# Patient Record
Sex: MALE | Race: WHITE
[De-identification: names, ages, dates, MRNs, and addresses within clinical notes are randomized per-mention and may not be internally consistent; named-entity substitution may affect disease eponyms.]

---

## 2018-04-04 ENCOUNTER — HOSPITAL ENCOUNTER (OUTPATIENT)
Dept: HOSPITAL 62 - RAD | Age: 58
End: 2018-04-04
Attending: PHYSICIAN ASSISTANT
Payer: COMMERCIAL

## 2018-04-04 DIAGNOSIS — M25.531: Primary | ICD-10-CM

## 2018-04-06 NOTE — RADIOLOGY REPORT (SQ)
EXAM DESCRIPTION:  MRI RT UPPER JOINT WITHOUT



COMPLETED DATE/TIME:  4/4/2018 8:03 pm



REASON FOR STUDY:  PAIN IN RIGHT WRIST M25.531  PAIN IN RIGHT WRIST



COMPARISON:  Previous study 4/10/2015



TECHNIQUE:  Right wrist images acquired and stored on PACS.  Multiplanar images include fat sensitive
 sequences as T1, fluid sensitive sequences as FST2/STIR, cartilage sensitive sequences as FSPD, grad
ient echo sequences.



LIMITATIONS:  None.



FINDINGS:  BONE MARROW: No alteration of signal to suggest marrow replacement or edema. No occult fra
cture. No large osteophytes.  Multiple cystic changes in the carpal bones.

CARPAL ALIGNMENT AND ARTICULATION: Normal congruity of sigmoid notch at level of distal RUJ without p
ositive or negative ulnar variance. Normal capitolunate angle. No widening of scapholunate articulati
on.

EFFUSION: None noted. No loose bodies.

SCAPHOLUNATE LIGAMENT: Intact without tear.

LUNATE-TRIQUETRAL LIGAMENT: Intact without tear.

TFC COMPLEX: Radial and ulnar attachments normal. Meniscus intact. Extensor carpi ulnaris tendon norm
al without tendinopathy.

EXTRINSIC LIGAMENTS AND DISTAL RADIO-ULNAR JOINT: Dorsal and volar distal RUJ intact without subluxat
ion of the distal ulna.

1-6 EXTENSOR COMPARTMENTS: The previously noted tenosynovitis of compartments 3 and 4 has resolved.  
Compartment 3 now appears normal.  Extensive fat is seenin compartment 4 and there appears to be atro
phy of several tendons.  There extensor indicis is not identified.

CARPAL TUNNEL AND MEDIAN NERVE: Normal volume and morphology of the carpal tunnel proximally at the l
evel of the radiocarpal joint and distally at the hook of the hamate. No thickening or signal alterat
ion of the median nerve.

OTHER: No other significant finding.



IMPRESSION:  Tenosynovitis seen in dorsal compartments 3 in for has resolved.  Tendon volume in whitney
rtment 3, extensor pollicis longus appears normal.  There is extensive fat in compartment 4, and ther
e appears to be tendon atrophy.  Specifically extensor indicis is not identified distally.



TECHNICAL DOCUMENTATION:  JOB ID:  [id number]

 2011 [employer]- All Rights Reserved



Reading location - IP/workstation name: [email or web address]

## 2018-05-25 ENCOUNTER — HOSPITAL ENCOUNTER (OUTPATIENT)
Dept: HOSPITAL 62 - OD | Age: 58
End: 2018-05-25
Attending: PHYSICIAN ASSISTANT
Payer: COMMERCIAL

## 2018-05-25 DIAGNOSIS — M79.642: Primary | ICD-10-CM

## 2018-05-25 DIAGNOSIS — M79.641: ICD-10-CM

## 2018-05-25 LAB
ADD MANUAL DIFF: NO
BASOPHILS # BLD AUTO: 0.1 10^3/UL (ref 0–0.2)
BASOPHILS NFR BLD AUTO: 0.6 % (ref 0–2)
CRP SERPL-MCNC: 16.8 MG/L (ref ?–10)
EOSINOPHIL # BLD AUTO: 0.2 10^3/UL (ref 0–0.6)
EOSINOPHIL NFR BLD AUTO: 2.2 % (ref 0–6)
ERYTHROCYTE [DISTWIDTH] IN BLOOD BY AUTOMATED COUNT: 13.2 % (ref 11.5–14)
ERYTHROCYTE [SEDIMENTATION RATE] IN BLOOD: 33 MM/HR (ref 0–20)
HCT VFR BLD CALC: 47.6 % (ref 37.9–51)
HGB BLD-MCNC: 16.2 G/DL (ref 13.5–17)
LYMPHOCYTES # BLD AUTO: 1.9 10^3/UL (ref 0.5–4.7)
LYMPHOCYTES NFR BLD AUTO: 19.9 % (ref 13–45)
MCH RBC QN AUTO: 31.2 PG (ref 27–33.4)
MCHC RBC AUTO-ENTMCNC: 34 G/DL (ref 32–36)
MCV RBC AUTO: 92 FL (ref 80–97)
MONOCYTES # BLD AUTO: 0.7 10^3/UL (ref 0.1–1.4)
MONOCYTES NFR BLD AUTO: 7.5 % (ref 3–13)
NEUTROPHILS # BLD AUTO: 6.7 10^3/UL (ref 1.7–8.2)
NEUTS SEG NFR BLD AUTO: 69.8 % (ref 42–78)
PLATELET # BLD: 361 10^3/UL (ref 150–450)
RBC # BLD AUTO: 5.2 10^6/UL (ref 4.35–5.55)
TOTAL CELLS COUNTED % (AUTO): 100 %
URATE SERPL-MCNC: 5.3 MG/DL (ref 3.5–8.5)
WBC # BLD AUTO: 9.6 10^3/UL (ref 4–10.5)

## 2018-05-25 PROCEDURE — 84550 ASSAY OF BLOOD/URIC ACID: CPT

## 2018-05-25 PROCEDURE — 86038 ANTINUCLEAR ANTIBODIES: CPT

## 2018-05-25 PROCEDURE — 86430 RHEUMATOID FACTOR TEST QUAL: CPT

## 2018-05-25 PROCEDURE — 85652 RBC SED RATE AUTOMATED: CPT

## 2018-05-25 PROCEDURE — 36415 COLL VENOUS BLD VENIPUNCTURE: CPT

## 2018-05-25 PROCEDURE — 85025 COMPLETE CBC W/AUTO DIFF WBC: CPT

## 2018-05-25 PROCEDURE — 86140 C-REACTIVE PROTEIN: CPT

## 2018-05-25 PROCEDURE — 86200 CCP ANTIBODY: CPT

## 2018-05-27 LAB — CYCLIC CITRUL PEPTIDE IGG/A AB: 3 UNITS (ref 0–19)

## 2020-01-09 ENCOUNTER — HOSPITAL ENCOUNTER (EMERGENCY)
Dept: HOSPITAL 62 - ER | Age: 60
Discharge: HOME | End: 2020-01-09
Payer: MEDICARE

## 2020-01-09 VITALS — SYSTOLIC BLOOD PRESSURE: 140 MMHG | DIASTOLIC BLOOD PRESSURE: 78 MMHG

## 2020-01-09 DIAGNOSIS — Z79.4: ICD-10-CM

## 2020-01-09 DIAGNOSIS — R10.84: Primary | ICD-10-CM

## 2020-01-09 DIAGNOSIS — I10: ICD-10-CM

## 2020-01-09 DIAGNOSIS — E11.9: ICD-10-CM

## 2020-01-09 DIAGNOSIS — F17.200: ICD-10-CM

## 2020-01-09 DIAGNOSIS — E66.9: ICD-10-CM

## 2020-01-09 LAB
ADD MANUAL DIFF: NO
ALBUMIN SERPL-MCNC: 4.2 G/DL (ref 3.5–5)
ALP SERPL-CCNC: 182 U/L (ref 38–126)
ANION GAP SERPL CALC-SCNC: 12 MMOL/L (ref 5–19)
APPEARANCE UR: CLEAR
APTT PPP: (no result) S
AST SERPL-CCNC: 13 U/L (ref 17–59)
BASE EXCESS BLDV CALC-SCNC: -3.5 MMOL/L
BASOPHILS # BLD AUTO: 0.1 10^3/UL (ref 0–0.2)
BASOPHILS NFR BLD AUTO: 1.2 % (ref 0–2)
BILIRUB DIRECT SERPL-MCNC: 0.2 MG/DL (ref 0–0.4)
BILIRUB SERPL-MCNC: 0.5 MG/DL (ref 0.2–1.3)
BILIRUB UR QL STRIP: NEGATIVE
BUN SERPL-MCNC: 9 MG/DL (ref 7–20)
CALCIUM: 9.8 MG/DL (ref 8.4–10.2)
CHLORIDE SERPL-SCNC: 89 MMOL/L (ref 98–107)
CO2 SERPL-SCNC: 33 MMOL/L (ref 22–30)
EOSINOPHIL # BLD AUTO: 0.1 10^3/UL (ref 0–0.6)
EOSINOPHIL NFR BLD AUTO: 0.8 % (ref 0–6)
ERYTHROCYTE [DISTWIDTH] IN BLOOD BY AUTOMATED COUNT: 13.7 % (ref 11.5–14)
GLUCOSE SERPL-MCNC: 496 MG/DL (ref 75–110)
GLUCOSE UR STRIP-MCNC: >=500 MG/DL
HCO3 BLDV-SCNC: 20.9 MMOL/L (ref 20–32)
HCT VFR BLD CALC: 46.1 % (ref 37.9–51)
HGB BLD-MCNC: 16.5 G/DL (ref 13.5–17)
KETONES UR STRIP-MCNC: NEGATIVE MG/DL
LYMPHOCYTES # BLD AUTO: 1.5 10^3/UL (ref 0.5–4.7)
LYMPHOCYTES NFR BLD AUTO: 15.3 % (ref 13–45)
MCH RBC QN AUTO: 32.6 PG (ref 27–33.4)
MCHC RBC AUTO-ENTMCNC: 35.7 G/DL (ref 32–36)
MCV RBC AUTO: 91 FL (ref 80–97)
MONOCYTES # BLD AUTO: 0.7 10^3/UL (ref 0.1–1.4)
MONOCYTES NFR BLD AUTO: 6.7 % (ref 3–13)
NEUTROPHILS # BLD AUTO: 7.5 10^3/UL (ref 1.7–8.2)
NEUTS SEG NFR BLD AUTO: 76 % (ref 42–78)
NITRITE UR QL STRIP: NEGATIVE
PCO2 BLDV: 35.8 MMHG (ref 35–63)
PH BLDV: 7.38 [PH] (ref 7.3–7.42)
PH UR STRIP: 5 [PH] (ref 5–9)
PLATELET # BLD: 341 10^3/UL (ref 150–450)
POTASSIUM SERPL-SCNC: 3.5 MMOL/L (ref 3.6–5)
PROT SERPL-MCNC: 7.5 G/DL (ref 6.3–8.2)
PROT UR STRIP-MCNC: NEGATIVE MG/DL
RBC # BLD AUTO: 5.04 10^6/UL (ref 4.35–5.55)
SP GR UR STRIP: 1.03
TOTAL CELLS COUNTED % (AUTO): 100 %
UROBILINOGEN UR-MCNC: NEGATIVE MG/DL (ref ?–2)
WBC # BLD AUTO: 9.8 10^3/UL (ref 4–10.5)

## 2020-01-09 PROCEDURE — 93005 ELECTROCARDIOGRAM TRACING: CPT

## 2020-01-09 PROCEDURE — 82803 BLOOD GASES ANY COMBINATION: CPT

## 2020-01-09 PROCEDURE — 80053 COMPREHEN METABOLIC PANEL: CPT

## 2020-01-09 PROCEDURE — 82962 GLUCOSE BLOOD TEST: CPT

## 2020-01-09 PROCEDURE — 81001 URINALYSIS AUTO W/SCOPE: CPT

## 2020-01-09 PROCEDURE — 99284 EMERGENCY DEPT VISIT MOD MDM: CPT

## 2020-01-09 PROCEDURE — 93010 ELECTROCARDIOGRAM REPORT: CPT

## 2020-01-09 PROCEDURE — 36415 COLL VENOUS BLD VENIPUNCTURE: CPT

## 2020-01-09 PROCEDURE — 71045 X-RAY EXAM CHEST 1 VIEW: CPT

## 2020-01-09 PROCEDURE — 74177 CT ABD & PELVIS W/CONTRAST: CPT

## 2020-01-09 PROCEDURE — 84484 ASSAY OF TROPONIN QUANT: CPT

## 2020-01-09 PROCEDURE — 96374 THER/PROPH/DIAG INJ IV PUSH: CPT

## 2020-01-09 PROCEDURE — 96376 TX/PRO/DX INJ SAME DRUG ADON: CPT

## 2020-01-09 PROCEDURE — 83690 ASSAY OF LIPASE: CPT

## 2020-01-09 PROCEDURE — 85025 COMPLETE CBC W/AUTO DIFF WBC: CPT

## 2020-01-09 PROCEDURE — 96361 HYDRATE IV INFUSION ADD-ON: CPT

## 2020-01-09 NOTE — EKG REPORT
SEVERITY:- ABNORMAL ECG -

SINUS RHYTHM

NONSPECIFIC IVCD WITH LAD

LOW VOLTAGE IN FRONTAL LEADS

:

Confirmed by: Chucho Lopez 09-Jan-2020 21:58:42

## 2020-01-09 NOTE — ER DOCUMENT REPORT
ED Medical Screen (RME)





- General


Chief Complaint: Abdominal Pain


Stated Complaint: ABDOMINAL PAIN


Time Seen by Provider: 01/09/20 11:39


Primary Care Provider: 


JAKE EDWARD PA-C [Primary Care Provider] - Follow up as needed


TRAVEL OUTSIDE OF THE [place] IN LAST 30 DAYS: No





- HPI


Notes: 





01/09/20 11:50


59-year-old male with a history of type 2 diabetes presents emergency room for 

complaints of generalized abdominal pain for the last week.  Patient was sent 

over by [employer] for concerns of pancreatitis and for a medical history 

hemoglobin A1c greater than 15%.  Denies any nausea vomiting diarrhea.  Patient 

reports intermittent chest pain or shortness of breath however he is not 

currently having any at this moment.  Has not tried any over-the-counter 

medications.  Worse with time, nothing makes better.  Decreased eating and 

drinking.





I have greeted and performed a rapid initial assessment of this patient.  A 

comprehensive ED assessment and evaluation of the patient, analysis of test 

results and completion of the medical decision making process will be conducted 

by additional ED providers.





PHYSICAL EXAMINATION:





GENERAL: Well-appearing, well-nourished and in mild distress





HEAD: Atraumatic, normocephalic.





EYES: Pupils equal round extraocular movements intact,  conjunctiva are normal.





NECK: Normal range of motion





CV: s1, s2 regular 


abd: generalized abd pain 





LUNGS: No respiratory distress





Musculoskeletal: Normal range of motion





NEUROLOGICAL:  Normal speech, normal gait. 





SKIN: Warm, Dry, normal turgor, no rashes or lesions noted.





- Related Data


Allergies/Adverse Reactions: 


                                        





No Known Allergies Allergy (Verified 03/13/16 22:11)


   











Past Medical History





- Past Medical History


Cardiac Medical History: Reports: Hx Coronary Artery Disease, Hx Hypertension


   Denies: Hx Heart Attack


Pulmonary Medical History: Reports: Hx Bronchitis, Hx COPD


   Denies: Hx Asthma, Hx Pneumonia


Neurological Medical History: Denies: Hx Cerebrovascular Accident - venous 

angioma, Hx Seizures


Endocrine Medical History: Reports: Hx Diabetes Mellitus Type 2


Musculoskeltal Medical History: Reports Hx Arthritis


Past Surgical History: Reports: Hx Orthopedic Surgery - bilat feet & hands





- Immunizations


Immunizations up to date: Yes


Hx Diphtheria, Pertussis, Tetanus Vaccination: No





Physical Exam





- Vital signs


Vitals: 





                                        











Temp Pulse Resp BP Pulse Ox


 


 98.7 F   88   20   140/78 H  96 


 


 01/09/20 11:29  01/09/20 11:29  01/09/20 11:29  01/09/20 11:29  01/09/20 11:29














Course





- Vital Signs


Vital signs: 





                                        











Temp Pulse Resp BP Pulse Ox


 


 98.7 F   88   20   140/78 H  96 


 


 01/09/20 11:29  01/09/20 11:29  01/09/20 11:29  01/09/20 11:29  01/09/20 11:29














Doctor's Discharge





- Discharge


Referrals: 


JAKE EDWARD PA-C [Primary Care Provider] - Follow up as needed

## 2020-01-09 NOTE — RADIOLOGY REPORT (SQ)
EXAM DESCRIPTION:  CT ABD/PELVIS WITH IV ONLY



COMPLETED DATE/TIME:  1/9/2020 1:46 pm



REASON FOR STUDY:  Generalized abdominal pain, rule out pancreatitis



COMPARISON:  None.



TECHNIQUE:  CT scan of the abdomen and pelvis performed using helical scanning technique with dynamic
 intravenous contrast injection.  No oral contrast. Images reviewed with lung, soft tissue, and bone 
windows. Reconstructed coronal and sagittal MPR images reviewed. Delayed images for evaluation of the
 urinary system also acquired. All images stored on PACS.

All CT scanners at this facility use dose modulation, iterative reconstruction, and/or weight based d
osing when appropriate to reduce radiation dose to as low as reasonably achievable (ALARA).

[facility]: Dose Right  [facility]: CareDose    [facility]: Dose Right    [facility]: Teradose 4D    [facility]: Smart Technologies



CONTRAST TYPE AND DOSE:  contrast/concentration: Isovue 350.00 mg/ml; Total Contrast Delivered: 87.0 
ml; Total Saline Delivered: 69.0 ml



RENAL FUNCTION:  GFR > 60.



RADIATION DOSE:  CT Rad equipment meets quality standard of care and radiation dose reduction techniq
ues were employed. CTDIvol: 7.5 - 10.2 mGy. DLP: 1004 mGy-cm..



LIMITATIONS:  None.



FINDINGS:  LOWER CHEST: No significant findings. No nodules or infiltrates.

LIVER: Normal size. No masses.  No dilated ducts.

SPLEEN: Normal size. No focal lesions.

PANCREAS: No masses. No significant calcifications. No adjacent inflammation or peripancreatic fluid 
collections. Pancreatic duct not dilated.

GALLBLADDER: No identified stones by CT criteria. No inflammatory changes to suggest cholecystitis.

ADRENAL GLANDS: No significant masses or asymmetry.

RIGHT KIDNEY AND URETER: No solid masses.   No significant calcifications.   No hydronephrosis or hyd
roureter.

LEFT KIDNEY AND URETER: No solid masses.   No significant calcifications.   No hydronephrosis or hydr
oureter.

AORTA AND VESSELS: No aneurysm.

RETROPERITONEUM: No retroperitoneal adenopathy, hemorrhage or masses.

BOWEL AND PERITONEAL CAVITY: No masses or inflammatory changes. No free fluid or peritoneal masses.

APPENDIX: Normal.

PELVIS: No mass.  No free fluid. Normal bladder.

ABDOMINAL WALL: No masses. No hernias.

BONES: No significant or acute findings.

OTHER: No other significant finding.



IMPRESSION:  NO SIGNIFICANT OR ACUTE FINDING IN THE ABDOMEN OR PELVIS ON CT SCAN WITH IV CONTRAST.



TECHNICAL DOCUMENTATION:  JOB ID:  [id number]

Quality ID # [id number]: Final reports with documentation of one or more dose reduction techniques (e.g., Au
tomated exposure control, adjustment of the mA and/or kV according to patient size, use of iterative 
reconstruction technique)

 2011 [employer]- All Rights Reserved



Reading location - IP/workstation name: [email or web address]

## 2020-01-09 NOTE — ER DOCUMENT REPORT
ED General





- General


Chief Complaint: Abdominal Pain


Stated Complaint: ABDOMINAL PAIN


Time Seen by Provider: 01/09/20 11:39


Primary Care Provider: 


EULA VALDERRAMA MD [ACTIVE STAFF] - Follow up in 1 week


JAKE EDWARD PA-C [NO LOCAL MD] - Follow up tomorrow


TRAVEL OUTSIDE OF THE [place] IN LAST 30 DAYS: No





- HPI


Notes: 





59-year-old male with history of type 2 diabetes on insulin to the emergency 

department with complaints of generalized abdominal pain that has been ongoing 

for the past 5 days.  He denies any nausea, vomiting, diarrhea.  He states that 

it does radiate through to his back.  He has a history of pancreatitis.  He went

to see his primary care physician this morning and they sent him to the 

emergency department for further evaluation.  He states that he has been taking 

his insulin as prescribed but typically his sugars run in the 300s or higher.  

Denies any chest pain or shortness of breath today.  His last colonoscopy was 

several years ago and he states he had a polyp and is supposed to have a repeat 

in 3 to 5 years.  He denies any constipation and his last bowel movement was 

this morning and normal.





- Related Data


Allergies/Adverse Reactions: 


                                        





No Known Allergies Allergy (Verified 03/13/16 22:11)


   











Past Medical History





- General


Information source: Patient





- Social History


Smoking Status: Current Every Day Smoker


Frequency of alcohol use: None


Drug Abuse: None


Family History: DM, Hypertension


Patient has suicidal ideation: No


Patient has homicidal ideation: No





- Past Medical History


Cardiac Medical History: Reports: Hx Coronary Artery Disease, Hx Hypertension


   Denies: Hx Heart Attack


Pulmonary Medical History: Reports: Hx Bronchitis, Hx COPD


   Denies: Hx Asthma, Hx Pneumonia


Neurological Medical History: Denies: Hx Cerebrovascular Accident - venous 

angioma, Hx Seizures


Endocrine Medical History: Reports: Hx Diabetes Mellitus Type 2


Musculoskeletal Medical History: Reports Hx Arthritis


Past Surgical History: Reports: Hx Orthopedic Surgery - bilat feet & hands





- Immunizations


Immunizations up to date: Yes


Hx Diphtheria, Pertussis, Tetanus Vaccination: No


Hx Pneumococcal Vaccination: 06/28/13





Review of Systems





- Review of Systems


Constitutional: Chills, Fever


EENT: No symptoms reported


Cardiovascular: No symptoms reported.  denies: Chest pain, Palpitations, 

Orthopnea, Dyspnea, Syncope, Dizziness


Respiratory: denies: Cough, Short of breath


Gastrointestinal: Abdominal pain.  denies: Diarrhea, Vomiting, Constipation


Genitourinary: No symptoms reported


Musculoskeletal: Back pain


Skin: No symptoms reported


Hematologic/Lymphatic: No symptoms reported


Neurological/Psychological: No symptoms reported





Physical Exam





- Vital signs


Vitals: 


                                        











Temp Pulse Resp BP Pulse Ox


 


 98.7 F   88   20   140/78 H  96 


 


 01/09/20 11:29  01/09/20 11:29  01/09/20 11:29  01/09/20 11:29  01/09/20 11:29











Interpretation: Normal





- General


General appearance: Appears well, Alert


In distress: None





- HEENT


Head: Normocephalic, Atraumatic


Eyes: Normal


Pupils: PERRL





- Respiratory


Respiratory status: No respiratory distress


Chest status: Nontender.  No: Accessory muscle use


Breath sounds: Normal.  No: Rales, Rhonchi, Stridor, Wheezing


Chest palpation: Normal





- Cardiovascular


Rhythm: Regular


Heart sounds: Normal auscultation


Murmur: No


Notes: 





no pitting edema





- Abdominal


Inspection: Obese


Distension: No distension


Bowel sounds: Normal


Tenderness: Tender - + generalized TTP with no focal areas of TTP. No rebound or

guarding.  Negative Huggins's sign, neg McBurney's point


Organomegaly: No organomegaly





- Back


Back: Normal, Nontender.  No: CVA tenderness





- Neurological


Neuro grossly intact: Yes


Cognition: Normal


Orientation: AAOx4


[place] Coma Scale Eye Opening: Spontaneous


[place] Coma Scale Verbal: Oriented


[place] Coma Scale Motor: Obeys Commands


[place] Coma Scale Total: 15


Speech: Normal


Cranial nerves: Normal


Cerebellar coordination: Normal


Motor strength normal: LUE, RUE, LLE, RLE


Additional motor exam normals: Equal 


Sensory: Normal





- Psychological


Associated symptoms: Normal affect, Normal mood





- Skin


Skin Temperature: Warm


Skin Moisture: Dry


Skin Color: Normal





Course





- Re-evaluation


Re-evalutation: 





01/09/20





patient with noted hyperglycemia and abdominal pain.  Normal Anion gap.  Bicarb 

is not acidotic.  Noted CT with no acute abnormalities.  Will address 

hyperglycemia and pain. Obtain venous ph.  UA with no UTI. 








IMpression:   Hyperglycemia, abdominal pain.  No evidence for DKA.  He has not 

been vomiting since arrival and he abdominal pain has signficantly improved 

since arrival.  Repeat abdominal exam reveals only mild generalized TTP, abd is 

soft. 








- Vital Signs


Vital signs: 


                                        











Temp Pulse Resp BP Pulse Ox


 


 98.7 F   88   20   140/78 H  96 


 


 01/09/20 11:29  01/09/20 11:29  01/09/20 11:29  01/09/20 11:29  01/09/20 11:29














- Laboratory


Result Diagrams: 


                                 01/09/20 12:20





                                 01/09/20 12:20


Laboratory results interpreted by me: 


                                        











  01/09/20 01/09/20 01/09/20





  12:20 12:20 12:45


 


Sodium  134.3 L  


 


Potassium  3.5 L  


 


Chloride  89 L  


 


Carbon Dioxide  33 H  


 


Glucose  496 H*  


 


POC Glucose   458 H* 


 


AST  13 L  


 


Alkaline Phosphatase  182 H  


 


Urine Glucose (UA)    >=500 H














  01/09/20





  16:02


 


Sodium 


 


Potassium 


 


Chloride 


 


Carbon Dioxide 


 


Glucose 


 


POC Glucose  200 H


 


AST 


 


Alkaline Phosphatase 


 


Urine Glucose (UA) 














- Diagnostic Test


Radiology reviewed: Image reviewed, Reports reviewed





- EKG Interpretation by Me


EKG shows normal: Sinus rhythm


Additional EKG results interpreted by me: 





01/09/20


NO STEMI, left anterior fasicular block.  This is unchanged from prior on 

4/15/2016 rate of 82.





Discharge





- Discharge


Clinical Impression: 


 Hyperglycemia





Abdominal pain


Qualifiers:


 Abdominal location: generalized Qualified Code(s): R10.84 - Generalized 

abdominal pain





Condition: Stable


Disposition: HOME, SELF-CARE


Instructions:  Abdominal Pain ([facility]), Hyperglycemia ([facility])


Additional Instructions: 


PUSH FLUIDS.  REST AT HOME.  TAKE MEDICINE AS PRESCRIBED.   MONITOR BLOOD SUGARS

CAREFULLY.  RETURN IMMEDIATELY IF WORSENING PAIN, FEVERS, INTRACTABLE VOMITING. 

FOLLOW UP WITH PCP TOMORROW. 


Prescriptions: 


Docusate Sodium [Colace 100 mg Capsule] 100 mg PO BID PRN #30 capsule


 PRN Reason: 


Hydrocodone/Acetaminophen [Norco 5-325 mg Tablet] 1 tab PO Q6H #6 tablet


Ondansetron [Zofran Odt 4 mg Tablet] 1 - 2 tab PO Q4H PRN #15 tab.rapdis


 PRN Reason: For Nausea/Vomiting


Referrals: 


JAKE EDWARD PA-C [NO LOCAL MD] - Follow up tomorrow


EULA VALDERRAMA MD [ACTIVE STAFF] - Follow up in 1 week

## 2020-01-09 NOTE — RADIOLOGY REPORT (SQ)
EXAM DESCRIPTION:  CHEST SINGLE VIEW



COMPLETED DATE/TIME:  1/9/2020 1:57 pm



REASON FOR STUDY:  sob and cp



COMPARISON:  4/15/2016



EXAM PARAMETERS:  NUMBER OF VIEWS: One view.

TECHNIQUE: Single frontal radiographic view of the chest acquired.

RADIATION DOSE: NA

LIMITATIONS: None.



FINDINGS:  LUNGS AND PLEURA: Limited generally linear opacification the left base.

MEDIASTINUM AND HILAR STRUCTURES: No masses.  Contour normal.

HEART AND VASCULAR STRUCTURES: Heart normal in size.  Normal vasculature.

BONES: No acute findings.

HARDWARE: None in the chest.

OTHER: No other significant finding.



IMPRESSION:  There appears to be linear atelectasis in the left base.  No acute cardiopulmonary findi
ng otherwise.



TECHNICAL DOCUMENTATION:  JOB ID:  [id number]

 2011 [employer]- All Rights Reserved



Reading location - IP/workstation name: [email or web address]

## 2020-03-09 ENCOUNTER — HOSPITAL ENCOUNTER (EMERGENCY)
Dept: HOSPITAL 62 - ER | Age: 60
Discharge: HOME | End: 2020-03-09
Payer: MEDICARE

## 2020-03-09 VITALS — SYSTOLIC BLOOD PRESSURE: 125 MMHG | DIASTOLIC BLOOD PRESSURE: 80 MMHG

## 2020-03-09 DIAGNOSIS — I10: ICD-10-CM

## 2020-03-09 DIAGNOSIS — I25.10: ICD-10-CM

## 2020-03-09 DIAGNOSIS — G62.9: ICD-10-CM

## 2020-03-09 DIAGNOSIS — F17.210: ICD-10-CM

## 2020-03-09 DIAGNOSIS — M79.10: Primary | ICD-10-CM

## 2020-03-09 DIAGNOSIS — E11.9: ICD-10-CM

## 2020-03-09 LAB
A TYPE INFLUENZA AG: NEGATIVE
ADD MANUAL DIFF: NO
ALBUMIN SERPL-MCNC: 3.8 G/DL (ref 3.5–5)
ALP SERPL-CCNC: 117 U/L (ref 38–126)
ANION GAP SERPL CALC-SCNC: 8 MMOL/L (ref 5–19)
APPEARANCE UR: CLEAR
APTT PPP: YELLOW S
AST SERPL-CCNC: 27 U/L (ref 17–59)
B INFLUENZA AG: NEGATIVE
BASOPHILS # BLD AUTO: 0.1 10^3/UL (ref 0–0.2)
BASOPHILS NFR BLD AUTO: 0.9 % (ref 0–2)
BILIRUB DIRECT SERPL-MCNC: 0 MG/DL (ref 0–0.4)
BILIRUB SERPL-MCNC: 0.3 MG/DL (ref 0.2–1.3)
BILIRUB UR QL STRIP: NEGATIVE
BUN SERPL-MCNC: 8 MG/DL (ref 7–20)
CALCIUM: 9.2 MG/DL (ref 8.4–10.2)
CHLORIDE SERPL-SCNC: 98 MMOL/L (ref 98–107)
CO2 SERPL-SCNC: 30 MMOL/L (ref 22–30)
EOSINOPHIL # BLD AUTO: 0.1 10^3/UL (ref 0–0.6)
EOSINOPHIL NFR BLD AUTO: 1.7 % (ref 0–6)
ERYTHROCYTE [DISTWIDTH] IN BLOOD BY AUTOMATED COUNT: 13.4 % (ref 11.5–14)
GLUCOSE SERPL-MCNC: 179 MG/DL (ref 75–110)
GLUCOSE UR STRIP-MCNC: >=500 MG/DL
HCT VFR BLD CALC: 42.3 % (ref 37.9–51)
HGB BLD-MCNC: 14.9 G/DL (ref 13.5–17)
KETONES UR STRIP-MCNC: NEGATIVE MG/DL
LYMPHOCYTES # BLD AUTO: 1.6 10^3/UL (ref 0.5–4.7)
LYMPHOCYTES NFR BLD AUTO: 19.4 % (ref 13–45)
MCH RBC QN AUTO: 32.8 PG (ref 27–33.4)
MCHC RBC AUTO-ENTMCNC: 35.3 G/DL (ref 32–36)
MCV RBC AUTO: 93 FL (ref 80–97)
MONOCYTES # BLD AUTO: 0.6 10^3/UL (ref 0.1–1.4)
MONOCYTES NFR BLD AUTO: 7.4 % (ref 3–13)
NEUTROPHILS # BLD AUTO: 6 10^3/UL (ref 1.7–8.2)
NEUTS SEG NFR BLD AUTO: 70.6 % (ref 42–78)
NITRITE UR QL STRIP: NEGATIVE
PH UR STRIP: 7 [PH] (ref 5–9)
PLATELET # BLD: 364 10^3/UL (ref 150–450)
POTASSIUM SERPL-SCNC: 4.7 MMOL/L (ref 3.6–5)
PROT SERPL-MCNC: 6.3 G/DL (ref 6.3–8.2)
PROT UR STRIP-MCNC: NEGATIVE MG/DL
RBC # BLD AUTO: 4.56 10^6/UL (ref 4.35–5.55)
SP GR UR STRIP: 1.02
TOTAL CELLS COUNTED % (AUTO): 100 %
UROBILINOGEN UR-MCNC: NEGATIVE MG/DL (ref ?–2)
WBC # BLD AUTO: 8.5 10^3/UL (ref 4–10.5)

## 2020-03-09 PROCEDURE — 85025 COMPLETE CBC W/AUTO DIFF WBC: CPT

## 2020-03-09 PROCEDURE — 99284 EMERGENCY DEPT VISIT MOD MDM: CPT

## 2020-03-09 PROCEDURE — 93010 ELECTROCARDIOGRAM REPORT: CPT

## 2020-03-09 PROCEDURE — 36415 COLL VENOUS BLD VENIPUNCTURE: CPT

## 2020-03-09 PROCEDURE — 84484 ASSAY OF TROPONIN QUANT: CPT

## 2020-03-09 PROCEDURE — 81001 URINALYSIS AUTO W/SCOPE: CPT

## 2020-03-09 PROCEDURE — 71045 X-RAY EXAM CHEST 1 VIEW: CPT

## 2020-03-09 PROCEDURE — 87804 INFLUENZA ASSAY W/OPTIC: CPT

## 2020-03-09 PROCEDURE — 93005 ELECTROCARDIOGRAM TRACING: CPT

## 2020-03-09 PROCEDURE — 80053 COMPREHEN METABOLIC PANEL: CPT

## 2020-03-09 PROCEDURE — 96372 THER/PROPH/DIAG INJ SC/IM: CPT

## 2020-03-09 PROCEDURE — 84443 ASSAY THYROID STIM HORMONE: CPT

## 2020-03-09 NOTE — RADIOLOGY REPORT (SQ)
AP Portable chest: 3/9/2020 8:10 PM CDT



History: 59-year old patient with hypertension.



Comparison: Chest radiograph performed 1/19/2020.



Findings: The cardiomediastinal silhouette is normal in size. No

pneumothorax is seen. Airspace opacity seen at the left lung base

which may reflect atelectasis. No discrete pleural effusion is

apparent.



Impression: There are airspace opacities at the left lung base

which may reflect atelectasis.

## 2020-03-09 NOTE — ER DOCUMENT REPORT
ED Medical Screen (RME)





- General


Chief Complaint: Pain All Over


Stated Complaint: BODYACHES


Time Seen by Provider: 03/09/20 17:05


Primary Care Provider: 


KAYDEN MATIAS MD [Primary Care Provider] - Follow up as needed


Mode of Arrival: Wheelchair


Information source: Patient


Notes: 





59-year-old male patient with history of nerve pain presenting to the emergency 

department from his primary care provider with complaints of pain all over for 

the last week.  Patient also reports dizziness and fatigue.  Denies any nausea, 

vomiting, diarrhea, fever.





Exam:


Heart sounds S1-S2 present, no ectopy noted.


Patient alert, oriented, internal questions appropriately.





I have greeted and performed a rapid initial assessment of this patient.  A 

comprehensive ED assessment and evaluation of the patient, analysis of test 

results and completion of the medical decision making process will be conducted 

by additional ED providers.  I have specifically instructed the patient or fa

nura members with the patient to immediately return to any nursing staff should 

anything change in the patient's condition or with their chief complaint.





TRAVEL OUTSIDE OF THE [place] IN LAST 30 DAYS: No





- Related Data


Allergies/Adverse Reactions: 


                                        





No Known Allergies Allergy (Verified 03/09/20 16:59)


   








Home Medications: novolin, gabapentin, amlodipine, trazadone, norvasc





Past Medical History





- Social History


Chew tobacco use (# tins/day): No


Frequency of alcohol use: None


Drug Abuse: None





- Past Medical History


Cardiac Medical History: Reports: Hx Coronary Artery Disease, Hx Hypertension


   Denies: Hx Heart Attack


Pulmonary Medical History: Reports: Hx Bronchitis, Hx COPD


   Denies: Hx Asthma, Hx Pneumonia


Neurological Medical History: Denies: Hx Cerebrovascular Accident - venous 

angioma, Hx Seizures


Endocrine Medical History: Reports: Hx Diabetes Mellitus Type 2


Musculoskeltal Medical History: Reports Hx Arthritis


Past Surgical History: Reports: Hx Orthopedic Surgery - bilat feet & hands





- Immunizations


Immunizations up to date: Yes


Hx Diphtheria, Pertussis, Tetanus Vaccination: No





Physical Exam





- Vital signs


Vitals: 





                                        











Temp Pulse Resp BP Pulse Ox


 


 98.5 F   93   18   125/80   94 


 


 03/09/20 16:57  03/09/20 16:57  03/09/20 16:57  03/09/20 16:57  03/09/20 16:57














Course





- Vital Signs


Vital signs: 





                                        











Temp Pulse Resp BP Pulse Ox


 


 98.5 F   93   18   125/80   94 


 


 03/09/20 16:57  03/09/20 16:57  03/09/20 16:57  03/09/20 16:57  03/09/20 16:57














Doctor's Discharge





- Discharge


Referrals: 


KAYDEN MATIAS MD [Primary Care Provider] - Follow up as needed

## 2020-03-09 NOTE — ER DOCUMENT REPORT
Entered by NORMA GUERRERO SCRIBE  03/09/20 2002 





Acting as scribe for:LORENA HART DO





ED General





- General


Chief Complaint: Pain All Over


Stated Complaint: BODYACHES


Time Seen by Provider: 03/09/20 17:05


Primary Care Provider: 


KAYDEN MATIAS MD [Primary Care Provider] - Follow up as needed


Mode of Arrival: Wheelchair


Information source: Patient


Notes: 





This 59 year old male patient with a history of CAD, HTN, and DM presents to the

ED today with complaints of pain all over for the past x1 week. Patient states 

that the pain initially started in his abdomen then moved up to his chest and 

then down to the "tip of my toes". Patient notes that he took Tylenol and 

Excedrin without relief. Patient also reports dizziness and fatigue. Patient 

states that he tries to use a cane to ambulate or doesn't walk at all. Patient 

reports a history of a hiatal hernia in the past, but denies any abdominal 

surgeries. Patient denies nausea, vomiting, diarrhea, fever, cough, throat pain,

nasal discharge, burning with urination, dysuria, hematuria, or skin rash. 

Patient also denies history of neuropathy, GERD, or indigestion.


TRAVEL OUTSIDE OF THE [place] IN LAST 30 DAYS: No





- Related Data


Allergies/Adverse Reactions: 


                                        





No Known Allergies Allergy (Verified 03/09/20 16:59)


   








Home Medications: novolin, gabapentin, amlodipine, trazadone, norvasc





Past Medical History





- General


Information source: Patient





- Social History


Smoking Status: Current Every Day Smoker


Cigarette use (# per day): Yes


Chew tobacco use (# tins/day): No


Smoking Education Provided: No


Frequency of alcohol use: None


Drug Abuse: None


Family History: Reviewed & Not Pertinent, DM, Hypertension


Patient has suicidal ideation: No


Patient has homicidal ideation: No





- Past Medical History


Cardiac Medical History: Reports: Hx Coronary Artery Disease, Hx Hypertension


Pulmonary Medical History: Reports: Hx Bronchitis, Hx COPD


Endocrine Medical History: Reports: Hx Diabetes Mellitus Type 2


Musculoskeletal Medical History: Reports Hx Arthritis


Past Surgical History: Reports: Hx Orthopedic Surgery - bilat feet & hands





- Immunizations


Immunizations up to date: Yes


Hx Diphtheria, Pertussis, Tetanus Vaccination: No


Hx Pneumococcal Vaccination: 06/28/13





Review of Systems





- Review of Systems


Constitutional: See HPI.  denies: Fever


EENT: See HPI.  denies: Nose congestion, Throat pain


Cardiovascular: See HPI, Chest pain


Respiratory: See HPI.  denies: Cough


Gastrointestinal: See HPI, Abdominal pain.  denies: Diarrhea, Nausea, Vomiting


Genitourinary: See HPI.  denies: Burning, Dysuria, Hematuria


Male Genitourinary: No symptoms reported


Musculoskeletal: See HPI, Muscle pain


Skin: See HPI.  denies: Rash


Hematologic/Lymphatic: No symptoms reported


Neurological/Psychological: No symptoms reported


-: Yes All other systems reviewed and negative





Physical Exam





- Vital signs


Vitals: 


                                        











Temp Pulse Resp BP Pulse Ox


 


 98.5 F   93   18   125/80   94 


 


 03/09/20 16:57  03/09/20 16:57  03/09/20 16:57  03/09/20 16:57  03/09/20 16:57











Interpretation: Normal





- General


General appearance: Alert


In distress: None





- HEENT


Head: Normocephalic, Atraumatic


Eyes: Normal


Pupils: PERRL





- Respiratory


Respiratory status: No respiratory distress


Chest status: Nontender


Breath sounds: Normal


Chest palpation: Normal





- Cardiovascular


Rhythm: Regular


Heart sounds: Normal auscultation


Murmur: No





- Abdominal


Inspection: Normal


Distension: No distension


Bowel sounds: Normal


Tenderness: Tender - Mild diffuse abdominal pain


Organomegaly: No organomegaly





- Back


Back: Normal, Nontender





- Extremities


General upper extremity: Normal inspection


General lower extremity: Normal inspection





- Neurological


Neuro grossly intact: Yes





- Psychological


Associated symptoms: Normal affect, Normal mood





- Skin


Skin Temperature: Warm


Skin Moisture: Dry


Skin Color: Normal





Course





- Re-evaluation


Re-evalutation: 





03/09/20 22:47


MDM  59 year old male with diffuse pain and reassuring exam here.  Likely some 

degree of neuropathic pain and has a pcp locally.  Pain is from abdomen - with 

reassuring abd exam - to tips of feet.  No rash and no fever.  Certainly 

nontoxic.  He knows to follow up.  I see no signs of serious disease process and

feel he can safely follow up.  He understands this and to return here for pro

blems or concerns. 











- Vital Signs


Vital signs: 


                                        











Temp Pulse Resp BP Pulse Ox


 


 98.5 F   93   18   125/80   94 


 


 03/09/20 16:57  03/09/20 16:57  03/09/20 16:57  03/09/20 16:57  03/09/20 16:57














- Laboratory


Result Diagrams: 


                                 03/09/20 17:39





                                 03/09/20 17:39


Laboratory results interpreted by me: 


                                        











  03/09/20 03/09/20





  17:30 17:39


 


Sodium   136.3 L


 


Creatinine   0.49 L


 


Glucose   179 H


 


ALT   54 H


 


Urine Glucose (UA)  >=500 H 














- Diagnostic Test


Radiology reviewed: Reports reviewed





- EKG Interpretation by Me


EKG shows normal: Sinus rhythm


Rate: Normal - NSR Left axis 82 BPM no st elevation or depression my 

interpretation.





Discharge





- Discharge


Clinical Impression: 


 Myalgia, Neuropathy





Condition: Good


Disposition: HOME, SELF-CARE


Instructions:  Diabetes ([facility]), Control of Diabetes During Illness ([facility]), High 

Blood Pressure ([facility]), High Blood Pressure, Requiring Treatment ([facility]), Neuropathy

([facility])


Additional Instructions: 


See your doctor in follow up tomorrow.  Rest.  Take your medicine as directed.  

Please return here for any problems or any concerns 


Referrals: 


KAYDEN MATIAS MD [Primary Care Provider] - Follow up as needed





I personally performed the services described in the documentation, reviewed and

edited the documentation which was dictated to the scribe in my presence, and it

accurately records my words and actions.

## 2020-03-10 NOTE — EKG REPORT
SEVERITY:- BORDERLINE ECG -

SINUS RHYTHM

PROBABLE LEFT ATRIAL ABNORMALITY

LEFT AXIS DEVIATION

LOW VOLTAGE IN FRONTAL LEADS

:

Confirmed by: Chucho Lopez 10-Mar-2020 11:46:51